# Patient Record
Sex: FEMALE | Race: WHITE | NOT HISPANIC OR LATINO | URBAN - METROPOLITAN AREA
[De-identification: names, ages, dates, MRNs, and addresses within clinical notes are randomized per-mention and may not be internally consistent; named-entity substitution may affect disease eponyms.]

---

## 2018-01-27 ENCOUNTER — EMERGENCY (EMERGENCY)
Facility: HOSPITAL | Age: 23
LOS: 1 days | Discharge: HOME | End: 2018-01-27
Attending: EMERGENCY MEDICINE
Payer: COMMERCIAL

## 2018-01-27 VITALS
SYSTOLIC BLOOD PRESSURE: 132 MMHG | OXYGEN SATURATION: 99 % | HEART RATE: 80 BPM | DIASTOLIC BLOOD PRESSURE: 78 MMHG | RESPIRATION RATE: 18 BRPM

## 2018-01-27 VITALS
HEART RATE: 70 BPM | TEMPERATURE: 98 F | WEIGHT: 184.97 LBS | HEIGHT: 69 IN | DIASTOLIC BLOOD PRESSURE: 80 MMHG | RESPIRATION RATE: 18 BRPM | SYSTOLIC BLOOD PRESSURE: 128 MMHG | OXYGEN SATURATION: 99 %

## 2018-01-27 PROCEDURE — 70450 CT HEAD/BRAIN W/O DYE: CPT | Mod: 26

## 2018-01-27 PROCEDURE — 70450 CT HEAD/BRAIN W/O DYE: CPT

## 2018-01-27 PROCEDURE — 73110 X-RAY EXAM OF WRIST: CPT

## 2018-01-27 PROCEDURE — 99284 EMERGENCY DEPT VISIT MOD MDM: CPT | Mod: 25

## 2018-01-27 PROCEDURE — 73110 X-RAY EXAM OF WRIST: CPT | Mod: 26,LT

## 2018-01-27 PROCEDURE — 99284 EMERGENCY DEPT VISIT MOD MDM: CPT

## 2018-01-27 RX ORDER — IBUPROFEN 200 MG
400 TABLET ORAL ONCE
Qty: 0 | Refills: 0 | Status: COMPLETED | OUTPATIENT
Start: 2018-01-27 | End: 2018-01-27

## 2018-01-27 RX ORDER — OXYCODONE AND ACETAMINOPHEN 5; 325 MG/1; MG/1
1 TABLET ORAL ONCE
Qty: 0 | Refills: 0 | Status: DISCONTINUED | OUTPATIENT
Start: 2018-01-27 | End: 2018-01-27

## 2018-01-27 RX ADMIN — OXYCODONE AND ACETAMINOPHEN 1 TABLET(S): 5; 325 TABLET ORAL at 21:01

## 2018-01-27 RX ADMIN — Medication 400 MILLIGRAM(S): at 21:01

## 2018-01-27 NOTE — ED ADULT NURSE NOTE - OBJECTIVE STATEMENT
received pt AOx3 c/o left wrist pain and head pain s/p snowboard injury 5 hours ago. left arm splinted by [facility], pt was not wearing helmet, admits to hitting head. denies LOC. MAEx4, neuro intact. resp even unlabored. will cont to monitor

## 2018-01-27 NOTE — ED PROVIDER NOTE - ATTENDING CONTRIBUTION TO CARE
I, Reji Lopez, performed the initial face to face bedside interview with this patient regarding history of present illness, review of symptoms and relevant past medical, social and family history.  I completed an independent physical examination.  I was the initial provider who evaluated this patient. I have signed out the follow up of any pending tests (i.e. labs, radiological studies) to the ACP.  I have communicated the patient’s plan of care and disposition with the ACP.

## 2018-01-27 NOTE — ED ADULT NURSE NOTE - CHPI ED SYMPTOMS NEG
no confusion/no loss of consciousness/no blurred vision/no dizziness/no change in level of consciousness

## 2018-01-27 NOTE — ED PROVIDER NOTE - OBJECTIVE STATEMENT
This is a 21 y/o F presents to ED c/o L wrist pain s/p snow boarding accident 1600 today. Reports she was snow boarding in [place] when she fell backwards, tried to catch her fall and landed on her wrist. Pt states she hit her head when she fell, currently has a HA with nausea. Was not wearing a helmet. Denies V/D, fever, chills, SOB, CP, difficulty breathing, numbness, tingling and abd pain.

## 2018-01-27 NOTE — ED PROVIDER NOTE - CARE PLAN
Principal Discharge DX:	Minor head injury without loss of consciousness, initial encounter  Secondary Diagnosis:	Wrist sprain, left, initial encounter

## 2018-01-27 NOTE — ED PROVIDER NOTE - PROGRESS NOTE DETAILS
PA NOTE: PT seen resting comfortably in no acute distress, no acute complaints at this time, PT tolerating PO intake,  PT informed of all results, pt informed of possibility of change in radiology read after official read, PT will be DC home with follow up tera, ortho, ibu pain control, pt educated about when to return to the ED if needed. PT verbalizes that he understands all instructions and results.

## 2018-06-21 ENCOUNTER — EMERGENCY (EMERGENCY)
Facility: HOSPITAL | Age: 23
LOS: 1 days | Discharge: HOME | End: 2018-06-21
Attending: EMERGENCY MEDICINE
Payer: COMMERCIAL

## 2018-06-21 VITALS
DIASTOLIC BLOOD PRESSURE: 76 MMHG | TEMPERATURE: 99 F | WEIGHT: 190.04 LBS | RESPIRATION RATE: 20 BRPM | SYSTOLIC BLOOD PRESSURE: 130 MMHG | HEIGHT: 68 IN | HEART RATE: 74 BPM | OXYGEN SATURATION: 98 %

## 2018-06-21 PROCEDURE — 99283 EMERGENCY DEPT VISIT LOW MDM: CPT

## 2018-06-21 PROCEDURE — 73630 X-RAY EXAM OF FOOT: CPT | Mod: 26,RT

## 2018-06-21 PROCEDURE — 73630 X-RAY EXAM OF FOOT: CPT

## 2018-06-21 NOTE — ED PROVIDER NOTE - MEDICAL DECISION MAKING DETAILS
Right foot pain s/p trauma, will obtain xray r/o possible bony pathology, educate on conservative therapy, motrin/tylenol for pain as needed, give referral to Podiatry for follow up as needed

## 2018-06-21 NOTE — ED PROVIDER NOTE - LOWER EXTREMITY EXAM, RIGHT
TENDERNESS/+ tenderness to palpation of dorsal mid foot , FROM of ankle and RLE digits, RLE ligaments grossly intact

## 2018-06-21 NOTE — ED PROVIDER NOTE - CHPI ED SYMPTOMS NEG
no tingling/no fever/no bruising/no deformity/no back pain/no numbness/no abrasion/no difficulty bearing weight/no stiffness/no weakness

## 2018-06-21 NOTE — ED PROVIDER NOTE - ATTENDING CONTRIBUTION TO CARE
21 y/o Female seen and evaluated with ACP  Presents to ED for right foot pain s/p trauma  ambulatory but persistent pain  no other complaints  vitals reviewed, exam as documented  pain meds, xr for bony injury  check xr and reassess  podiatry f/u

## 2018-06-21 NOTE — ED PROVIDER NOTE - MUSCULOSKELETAL MINIMAL EXAM
neck supple/normal range of motion/no muscle tenderness/no midline vertebral tenderness, FROM/atraumatic/motor intact

## 2018-06-21 NOTE — ED PROVIDER NOTE - OBJECTIVE STATEMENT
21 yo F PT , no pertinent PmHX, presents to Ed complaining of right foot pain x 9 days . PT states 23 yo F PT , no pertinent PmHX, presents to Ed complaining of right foot pain x 9 days . PT admits to right dorsal foot pain s/p being struck with a lacrosse ball in her foot. PT denies numbness, tingling, weakness, difficulty bearing weight, LOC, head injury, syncope, and any other acute symptoms at this time.

## 2018-06-21 NOTE — ED PROVIDER NOTE - SKIN, MLM
Skin normal color for race, warm, dry and intact. No evidence of rash. no ecchymosis noted on right foot

## 2020-10-20 ENCOUNTER — EMERGENCY (EMERGENCY)
Facility: HOSPITAL | Age: 25
LOS: 1 days | Discharge: HOME | End: 2020-10-20
Attending: EMERGENCY MEDICINE
Payer: SELF-PAY

## 2020-10-20 VITALS
WEIGHT: 182.98 LBS | SYSTOLIC BLOOD PRESSURE: 124 MMHG | HEART RATE: 78 BPM | RESPIRATION RATE: 18 BRPM | DIASTOLIC BLOOD PRESSURE: 70 MMHG | OXYGEN SATURATION: 99 % | TEMPERATURE: 100 F | HEIGHT: 68 IN

## 2020-10-20 PROCEDURE — 73610 X-RAY EXAM OF ANKLE: CPT | Mod: 26,RT

## 2020-10-20 PROCEDURE — 99283 EMERGENCY DEPT VISIT LOW MDM: CPT

## 2020-10-20 PROCEDURE — 73610 X-RAY EXAM OF ANKLE: CPT

## 2020-10-20 RX ORDER — IBUPROFEN 200 MG
600 TABLET ORAL ONCE
Refills: 0 | Status: COMPLETED | OUTPATIENT
Start: 2020-10-20 | End: 2020-10-20

## 2020-10-20 RX ADMIN — Medication 600 MILLIGRAM(S): at 09:34

## 2020-10-20 NOTE — ED PROVIDER NOTE - PATIENT PORTAL LINK FT
You can access the FollowMyHealth Patient Portal offered by [facility] by registering at the following website: [email or web address]. By joining [facility]’s FollowMyHealth portal, you will also be able to view your health information using other applications (apps) compatible with our system.

## 2020-10-20 NOTE — ED ADULT TRIAGE NOTE - CHIEF COMPLAINT QUOTE
patient c/o working out last night and dropped weight on ankle and this morning unable to put pressure on it.

## 2020-10-20 NOTE — ED ADULT NURSE NOTE - NSIMPLEMENTINTERV_GEN_ALL_ED
Implemented All Fall Risk Interventions:  [place] to call system. Call bell, personal items and telephone within reach. Instruct patient to call for assistance. Room bathroom lighting operational. Non-slip footwear when patient is off stretcher. Physically safe environment: no spills, clutter or unnecessary equipment. Stretcher in lowest position, wheels locked, appropriate side rails in place. Provide visual cue, wrist band, yellow gown, etc. Monitor gait and stability. Monitor for mental status changes and reorient to person, place, and time. Review medications for side effects contributing to fall risk. Reinforce activity limits and safety measures with patient and family.

## 2020-10-20 NOTE — ED PROVIDER NOTE - ATTENDING CONTRIBUTION TO CARE
25 year old female no PMHx c/o ankle pain s/p trauma yesterday. PE: right lateral ankle and inversion tenderness, neurovascularly intact. I&P: X-ray WNL, ankle sprain, non-weight bearing, analgesics, ortho follow up

## 2020-10-20 NOTE — ED PROVIDER NOTE - NSFOLLOWUPCLINICS_GEN_ALL_ED_FT
[facility]  Orthopedics  [address] [place] [id number]  Phone: [phone]  Fax:   Follow Up Time:

## 2020-10-20 NOTE — ED PROVIDER NOTE - OBJECTIVE STATEMENT
25 year old female with no PMhx presents to the ED for R ankle pain. Pt reports yesterday she was lifting heavy weights and felt that she mis-steped. States she has not been able to walk since then. Denies knee pain, foot pain, shin pain, lacs, abrasions. Has not taken anything for pain.

## 2020-10-20 NOTE — ED PROVIDER NOTE - NSFOLLOWUPINSTRUCTIONS_ED_ALL_ED_FT
-Follow up with orthopedist    Sprain    A sprain is a stretch or tear in one of the tough, fiber-like tissues (ligaments) in your body. This is caused by an injury to the area such as a twisting mechanism. Symptoms include pain, swelling, or bruising. Rest that area over the next several days and slowly resume activity when tolerated. Ice can help with swelling and pain.     SEEK IMMEDIATE MEDICAL CARE IF YOU HAVE ANY OF THE FOLLOWING SYMPTOMS: worsening pain, inability to move that body part, numbness or tingling.

## 2020-10-21 NOTE — ED POST DISCHARGE NOTE - DETAILS
Spoke with pt, discussed results, provided information for podiatry-  and advised pt to call medical records for a copy of her results

## 2020-10-23 PROBLEM — Z00.00 ENCOUNTER FOR PREVENTIVE HEALTH EXAMINATION: Status: ACTIVE | Noted: 2020-10-23

## 2020-10-26 ENCOUNTER — APPOINTMENT (OUTPATIENT)
Dept: ORTHOPEDIC SURGERY | Facility: CLINIC | Age: 25
End: 2020-10-26
Payer: SELF-PAY

## 2020-10-26 VITALS
WEIGHT: 184 LBS | SYSTOLIC BLOOD PRESSURE: 123 MMHG | HEART RATE: 75 BPM | BODY MASS INDEX: 27.89 KG/M2 | DIASTOLIC BLOOD PRESSURE: 65 MMHG | HEIGHT: 68 IN

## 2020-10-26 VITALS — TEMPERATURE: 95.9 F

## 2020-10-26 DIAGNOSIS — S92.214A NONDISPLACED FRACTURE OF CUBOID BONE OF RIGHT FOOT, INITIAL ENCOUNTER FOR CLOSED FRACTURE: ICD-10-CM

## 2020-10-26 DIAGNOSIS — M25.571 PAIN IN RIGHT ANKLE AND JOINTS OF RIGHT FOOT: ICD-10-CM

## 2020-10-26 PROCEDURE — 28450 TX TARSAL B1 FX W/O MNPJ EA: CPT | Mod: RT

## 2020-10-26 PROCEDURE — 99203 OFFICE O/P NEW LOW 30 MIN: CPT | Mod: 57

## 2020-10-26 NOTE — HISTORY OF PRESENT ILLNESS
[de-identified] : Patient is a pleasant 25-year-old female who presents today for evaluation of right foot pain.  She is an avid gym participant and dropped a heavy weight bar on her foot last week.  She noted immediate pain.  She was seen in urgent care and x-rays were obtained.  A small cuboid avulsion fracture was diagnosed.  She elevated and iced the foot for several days and her pain has decreased but she still has some pain with activity and weightbearing.  The patient states the pain is made worse with activity and relieved with rest.  [3] : a current pain level of 3/10 [Bending] : worsened by bending [Lifting] : worsened by lifting [Weight Bearing] : worsened by weight bearing [Recumbency] : relieved by recumbency [Rest] : relieved by rest [de-identified] : aching

## 2020-10-26 NOTE — PHYSICAL EXAM
[de-identified] : Physical Exam:\par General: Well appearing, no acute distress, A&O\par Neurologic: A&Ox3, No focal deficits\par Head: NCAT without abrasions, lacerations, or ecchymosis to head, face, or scalp\par Respiratory: Equal chest wall expansion bilaterally, no accessory muscle use\par Lymphatic: No lymphadenopathy palpated\par Skin: Warm and dry\par Psychiatric: Normal mood and affect\par \par RLE:\par SILT s/s/sp/dp/t\par Fires EHL/FHL/GS/TA\par 2+ DP/PT pulse\par brisk capillary refill\par Positive tenderness to palpation over the cuboid [de-identified] : Plain films of the right foot and ankle were previously obtained and reviewed today.  There is a small avulsion type fracture from the cuboid.  The Lisfranc joint is intact.  No other signs of fracture, subluxation or dislocated are noted.

## 2020-10-26 NOTE — DISCUSSION/SUMMARY
[de-identified] : 25-year-old female status post injury with small cuboid avulsion fracture.  She likely has small ligamentous and soft tissue injuries to the foot as well.  I would recommend attempting to treat this initially with conservative management as well as physical therapy.  Conservative measures of treatment include rest until asymptomatic, activity avoidance, NSAID's PRN, acetaminophen, application to ice to the area 2-3x daily for 20 minutes, with gradual return to activities.  If she is unable to return to activities in the next few weeks, we may wish to obtain an MRI of the foot to evaluate for any signs of a ligamentous Lisfranc type injury.  Hopefully she will have no issues and can slowly return to activity as tolerated.\par \par The question of when to drive is impossible to generalize to everyone because it is largely dependent on the individual.  Importantly, doctors do not have a license with the DMV to "clear you" or "release you" to return to driving.  There are 3 primary criteria that must be met.  You need to be off of narcotic pain medicines (otherwise you are driving under the influence).  You need to be able to get in and out of the 's seat comfortably.  And you must have regained your normal reflexes / strength.  Also, return to driving depends partly on what side had the injury (ie. Right leg operates the pedals; people with Left side injuries can generally get back to driving much sooner unless you have a clutch).  The average time to return to driving is around 2 weeks after you return to normal walking for the right side and usually sooner for the left.  We recommend 'testing' yourself with another licensed  in an empty parking lot or quiet street first in order to check your reflexes moving your foot from pedal to pedal.\par \par The patient was given the opportunity to ask questions and all questions were answered to their satisfaction.\par \par Efren Ruby MD\par Orthopaedic Trauma Surgeon\par [facility] [place]\par Director Orthopaedic Trauma, [facility]\par \par \par \par

## 2024-04-15 NOTE — ED ADULT NURSE NOTE - RESPIRATORY ASSESSMENT
pt calling said that she is having trouble swallowing might go to the ER .       Appt scheduled 4/18   WDL
